# Patient Record
Sex: FEMALE | Race: WHITE | URBAN - METROPOLITAN AREA
[De-identification: names, ages, dates, MRNs, and addresses within clinical notes are randomized per-mention and may not be internally consistent; named-entity substitution may affect disease eponyms.]

---

## 2017-11-20 ENCOUNTER — DOCTOR'S OFFICE (OUTPATIENT)
Dept: URBAN - METROPOLITAN AREA CLINIC 125 | Facility: CLINIC | Age: 57
Setting detail: OPHTHALMOLOGY
End: 2017-11-20
Payer: COMMERCIAL

## 2017-11-20 DIAGNOSIS — H18.832: ICD-10-CM

## 2017-11-20 PROCEDURE — 92012 INTRM OPH EXAM EST PATIENT: CPT | Performed by: OPHTHALMOLOGY

## 2017-11-20 ASSESSMENT — REFRACTION_CURRENTRX
OS_OVR_VA: 20/
OD_OVR_VA: 20/
OS_OVR_VA: 20/
OS_OVR_VA: 20/

## 2017-11-20 ASSESSMENT — REFRACTION_AUTOREFRACTION
OD_SPHERE: +0.25
OS_SPHERE: +1.25
OS_AXIS: 084
OS_CYLINDER: -1.75
OD_AXIS: 010
OD_CYLINDER: -2.29

## 2017-11-20 ASSESSMENT — REFRACTION_MANIFEST
OD_VA1: 20/
OD_VA2: 20/
OD_VA3: 20/
OD_VA1: 20/
OS_VA3: 20/
OS_VA3: 20/
OD_VA2: 20/
OU_VA: 20/
OS_VA2: 20/
OD_VA2: 20/
OS_VA3: 20/
OS_VA1: 20/
OD_VA3: 20/
OD_VA3: 20/
OS_VA2: 20/
OD_VA1: 20/
OS_VA1: 20/
OS_VA1: 20/
OU_VA: 20/
OU_VA: 20/
OS_VA2: 20/

## 2017-11-20 ASSESSMENT — SPHEQUIV_DERIVED
OD_SPHEQUIV: -0.9
OS_SPHEQUIV: 0.375

## 2017-11-20 ASSESSMENT — VISUAL ACUITY
OD_BCVA: 20/25-1
OS_BCVA: 20/25-1

## 2021-01-09 ENCOUNTER — OFFICE VISIT (OUTPATIENT)
Dept: URGENT CARE | Facility: CLINIC | Age: 61
End: 2021-01-09
Payer: COMMERCIAL

## 2021-01-09 VITALS
RESPIRATION RATE: 20 BRPM | OXYGEN SATURATION: 95 % | HEIGHT: 66 IN | WEIGHT: 170 LBS | BODY MASS INDEX: 27.32 KG/M2 | HEART RATE: 85 BPM | TEMPERATURE: 98 F

## 2021-01-09 DIAGNOSIS — U07.1 COVID-19: Primary | ICD-10-CM

## 2021-01-09 DIAGNOSIS — R68.89 FLU-LIKE SYMPTOMS: ICD-10-CM

## 2021-01-09 PROCEDURE — U0005 INFEC AGEN DETEC AMPLI PROBE: HCPCS | Performed by: FAMILY MEDICINE

## 2021-01-09 PROCEDURE — U0003 INFECTIOUS AGENT DETECTION BY NUCLEIC ACID (DNA OR RNA); SEVERE ACUTE RESPIRATORY SYNDROME CORONAVIRUS 2 (SARS-COV-2) (CORONAVIRUS DISEASE [COVID-19]), AMPLIFIED PROBE TECHNIQUE, MAKING USE OF HIGH THROUGHPUT TECHNOLOGIES AS DESCRIBED BY CMS-2020-01-R: HCPCS | Performed by: FAMILY MEDICINE

## 2021-01-09 PROCEDURE — 99203 OFFICE O/P NEW LOW 30 MIN: CPT | Performed by: FAMILY MEDICINE

## 2021-01-09 RX ORDER — METOPROLOL SUCCINATE 25 MG/1
25 TABLET, EXTENDED RELEASE ORAL DAILY
COMMUNITY
Start: 2020-11-12

## 2021-01-09 RX ORDER — ATORVASTATIN CALCIUM 20 MG/1
20 TABLET, FILM COATED ORAL EVERY EVENING
COMMUNITY
Start: 2020-11-14

## 2021-01-09 RX ORDER — HYDROCHLOROTHIAZIDE 25 MG/1
25 TABLET ORAL DAILY
COMMUNITY
Start: 2020-10-19

## 2021-01-09 RX ORDER — LOSARTAN POTASSIUM 100 MG/1
TABLET ORAL
COMMUNITY
Start: 2020-12-16

## 2021-01-09 NOTE — PROGRESS NOTES
Assessment/Plan:  I recommend supportive care fluids rest and Tylenol as needed for pain or fever  If her symptoms become any worse she should present to the emergency department immediately  She should remain home in self quarantine for at least 10 days from now and at least 1 day without symptoms before leaving quarantine  Diagnoses and all orders for this visit:    COVID-19    Flu-like symptoms  -     Novel Coronavirus (COVID-19), PCR [facility] - Office Collection    Other orders  -     atorvastatin (LIPITOR) 20 mg tablet; Take 20 mg by mouth every evening  -     losartan (COZAAR) 100 MG tablet; Take 1 tablet by mouth once daily  -     hydrochlorothiazide (HYDRODIURIL) 25 mg tablet; Take 25 mg by mouth daily  -     metoprolol succinate (TOPROL-XL) 25 mg 24 hr tablet; Take 25 mg by mouth daily  -     ASPIRIN 81 PO; Take 81 mg by mouth            Subjective:        Patient ID: Maddie bartholomew a 64 y o  female  Patient presents with:  COVID-19: fever,cough,labored breathing, headache and body aches x1 day             The following portions of the patient's history were reviewed and updated as appropriate: allergies, current medications, past family history, past medical history, past social history, past surgical history and problem list       Review of Systems   Constitutional: Positive for activity change, appetite change, chills, fatigue and fever  HENT: Positive for congestion  Eyes: Negative  Respiratory: Positive for cough and shortness of breath  Cardiovascular: Negative  Gastrointestinal: Negative  Genitourinary: Negative  Objective:             Pulse 85   Temp 98 °F (36 7 °C) (Temporal)   Resp 20   Ht 5' 6" (1 676 m)   Wt 77 1 kg (170 lb)   SpO2 95%   BMI 27 44 kg/m²          Physical Exam  Vitals signs and nursing note reviewed  Constitutional:       Appearance: She is well-developed  HENT:      Head: Normocephalic and atraumatic        Right Ear: External ear normal       Left Ear: External ear normal       Nose: Nose normal    Eyes:      Conjunctiva/sclera: Conjunctivae normal       Pupils: Pupils are equal, round, and reactive to light  Neck:      Musculoskeletal: Normal range of motion and neck supple  Cardiovascular:      Rate and Rhythm: Normal rate and regular rhythm  Heart sounds: Normal heart sounds  Pulmonary:      Effort: Pulmonary effort is normal       Breath sounds: Rales present  No wheezing or rhonchi  Abdominal:      General: Bowel sounds are normal       Palpations: Abdomen is soft  Musculoskeletal: Normal range of motion  Skin:     General: Skin is warm and dry  Neurological:      Mental Status: She is alert and oriented to person, place, and time  Deep Tendon Reflexes: Reflexes are normal and symmetric     Psychiatric:         Behavior: Behavior normal

## 2021-01-09 NOTE — LETTER
January 9, 2021     Patient: Aaron Jacob   YOB: 1960   Date of Visit: 1/9/2021       To Whom it May Concern:    Aaron Jacob is under my professional care  She was seen in my office on 1/9/2021  She may return to work on Wednesday January 20, 2021 providing she is symptom free for 1 day before returning to work       If you have any questions or concerns, please don't hesitate to call           Sincerely,          Jm Xiao DO        CC: No Recipients

## 2021-01-11 LAB — SARS-COV-2 RNA SPEC QL NAA+PROBE: DETECTED

## 2021-01-11 NOTE — RESULT ENCOUNTER NOTE
I tried to get in touch with the patient regarding her test results  The number listed is a wrong number  If possible please try to get in touch with the patient to let her know her coronavirus test is positive and she should quarantine per CDC guidelines as we discussed during the visit  Thank you

## 2021-01-13 ENCOUNTER — TELEPHONE (OUTPATIENT)
Dept: URGENT CARE | Facility: CLINIC | Age: 61
End: 2021-01-13

## 2021-07-20 ENCOUNTER — OFFICE VISIT (OUTPATIENT)
Dept: URGENT CARE | Facility: CLINIC | Age: 61
End: 2021-07-20
Payer: COMMERCIAL

## 2021-07-20 VITALS
DIASTOLIC BLOOD PRESSURE: 56 MMHG | BODY MASS INDEX: 26.52 KG/M2 | OXYGEN SATURATION: 99 % | WEIGHT: 165 LBS | HEART RATE: 73 BPM | TEMPERATURE: 97.3 F | RESPIRATION RATE: 16 BRPM | HEIGHT: 66 IN | SYSTOLIC BLOOD PRESSURE: 124 MMHG

## 2021-07-20 DIAGNOSIS — S91.301A WOUND OF RIGHT FOOT: Primary | ICD-10-CM

## 2021-07-20 PROCEDURE — 90715 TDAP VACCINE 7 YRS/> IM: CPT | Performed by: PHYSICIAN ASSISTANT

## 2021-07-20 PROCEDURE — 90471 IMMUNIZATION ADMIN: CPT | Performed by: PHYSICIAN ASSISTANT

## 2021-07-20 PROCEDURE — 90715 TDAP VACCINE 7 YRS/> IM: CPT

## 2021-07-20 PROCEDURE — 99213 OFFICE O/P EST LOW 20 MIN: CPT | Performed by: PHYSICIAN ASSISTANT

## 2021-07-20 NOTE — PATIENT INSTRUCTIONS
Clean with soap and water  Topical antibiotic ointment  Keep area clean  Monitor for signs of infection including redness, swelling, drainage, streaking up the extremity and fever  Seek medical attention if you have these symptoms  Follow up with PCP in 3-5 days  Go to ER if symptoms become severe  Wound Healing and Your Diet   WHAT YOU NEED TO KNOW:   Your body uses nutrients from healthy foods to heal wounds caused by injury, surgery, or pressure injuries  There is no special diet that will heal your wound, but a healthy meal plan can help your wound heal faster  Nutrients that are important for healing are protein, zinc, and vitamin C  Liquids are also important for wound healing  DISCHARGE INSTRUCTIONS:   Follow a healthy meal plan:   · Eat a variety of foods from each food group every day  Eat regular meals and snacks to help you get enough calories and nutrients  If you have trouble eating 3 meals each day, eat 5 to 6 small meals throughout the day instead  Include good sources of protein, zinc, and vitamin C each day  · Drink liquids as directed  Drink plenty of liquids during and between meals, unless your healthcare provider has told you to limit liquids  Ask your healthcare provider or dietitian how much liquid to drink each day and which liquids are best for you  · Limit unhealthy foods,  such as those that are high in fat, sugar, and salt  Examples include doughnuts, cookies, fried foods, candy, and regular soda  These kinds of foods are low in nutrients that are important for healing  Good sources of protein:  Your dietitian will tell you how much protein and how many calories you need each day  The average amount of protein in foods is listed below in grams (g)  To find the exact amount of protein in a food, read the food labels on packaged items  · Dairy:      ? 1 cup of any type of milk (8 g)    ? ½ cup of evaporated canned milk (9 g)    ?  ¼ cup of nonfat dry milk (11 g)    ? 1 ounce of semi-hard or solid cheese (7 g)    ? ¼ cup of parmesan cheese (8 g)    ? ½ cup of cottage cheese (14 g)    ? ½ cup of pudding (4 g)    ? 1 cup of plain or fruit yogurt (8 g)    · Meats and meat substitutes:      ? 3 ounces of cooked freshwater fish (21 g)    ? 3 ounces of cooked shellfish (19 g)    ? ½ cup of canned tuna (14 g)    ? 3 ounces of cooked chicken, turkey, or other poultry (24 g)    ? 3 ounces of cooked beef, pork, lamb, or other red meat (21 g)    ? 1 large egg (6 g)    ? ¼ cup of fat-free egg substitute (5 g)    ? ½ cup of tofu or tempeh (10 g)    ? 1 cup of cooked dried beans, such as lance, kidney, or navy (15 g)    · Nuts and seeds:      ? 2 tablespoons of almonds, cashews, sunflower seeds, or walnuts (5 g)    ? 2 tablespoons of peanuts (7 g)    ? 2 tablespoons of peanut butter (8 g)     How to add extra protein:   · Add powdered milk to milk, cereals, scrambled eggs, soups, and casseroles  · Add cheese to sauces, soups, or vegetables  · Add eggs to tuna, salads, sauces, or casseroles  · Add nutrition supplements and breakfast drink mixes to milk or shakes  · Add nuts to foods or eat them as snacks  · Add meat (beef, chicken, or pork) to soups, casseroles, pasta dishes, or vegetables  · Add beans, peas, and other legumes to salads  · Eat cottage cheese or yogurt with fruit  Good sources of vitamin C:  Vitamin C is found in fruits and vegetables  Fruits such as oranges, strawberries, grapefruit, cantaloupe, and tangerines are good sources of vitamin C  Red and green bell peppers, broccoli, potatoes, tomatoes, and cabbage are also high in vitamin C      Good sources of zinc:  Good sources of zinc are beef, liver, and crab  Smaller amounts of zinc are found in sunflower seeds, almonds, peanut butter, eggs, and milk  Other foods that contain zinc include wheat germ, black-eyed peas, and whole-grain products  How to add extra calories to foods:   Your dietitian may recommend that you add extra calories to your meals if you are not eating enough  You can increase calories by adding butter, margarine, sugar, or jam to foods  Work with your dietitian if you have other medical conditions and need to follow a special diet  What else you should know about nutrients and wound healing: Your healthcare provider or dietitian may recommend vitamin and nutrition supplements if your body is low in certain nutrients  If your dietitian recommends a liquid nutrition supplement, take it between meals  Ask your healthcare provider which supplements are right for you  © Copyright [address] 2021 Information is for End User's use only and may not be sold, redistributed or otherwise used for commercial purposes  All illustrations and images included in CareNotes® are the copyrighted property of A D A M [employer]  or Mattie Carrillo  The above information is an  only  It is not intended as medical advice for individual conditions or treatments  Talk to your doctor, nurse or pharmacist before following any medical regimen to see if it is safe and effective for you

## 2021-07-20 NOTE — PROGRESS NOTES
[facility]        NAME: Adam Escobar is a 64 y o  female  : 1960    MRN: [id number]  DATE: 2021  TIME: 9:01 AM    Assessment and Plan   Wound of right foot [S91 301A]  1  Wound of right foot  Tdap Vaccine greater than or equal to 8yo      discussed with patient possibility of splinter however, no visible, palpable   foreign body on exam   Discussed with patient at x-ray would not show would foreign body  She is to monitor follow-up with family doctor if symptoms continue  Patient Instructions   Clean with soap and water  Topical antibiotic ointment  Keep area clean  Monitor for signs of infection including redness, swelling, drainage, streaking up the extremity and fever    Follow up with PCP in 3-5 days  Proceed to  ER if symptoms worsen  Chief Complaint     Chief Complaint   Patient presents with    Foot Injury     ran into a tree root yesterday and punctured right foot, started swelling last night  last tdap          History of Present Illness       Patient is a 82-year-old female with significant past medical history of hypertension and hyperlipidemia presents the office after sustaining injury to her right foot yesterday  Patient reports she was walking outside with full flops on his bumped her foot into a root growing out the ground  She puncture herself with the would  Denies known foreign body  States the area has become more swollen and tender since the injury  Denies fevers, chills, purulent drainage, erythema, or red streaking  Last tetanus   Review of Systems   Review of Systems   Constitutional: Negative for chills and fever  Skin: Positive for wound           Current Medications       Current Outpatient Medications:     ASPIRIN 81 PO, Take 81 mg by mouth, Disp: , Rfl:     atorvastatin (LIPITOR) 20 mg tablet, Take 20 mg by mouth every evening, Disp: , Rfl:     hydrochlorothiazide (HYDRODIURIL) 25 mg tablet, Take 25 mg by mouth daily, Disp: , Rfl:     losartan (COZAAR) 100 MG tablet, Take 1 tablet by mouth once daily, Disp: , Rfl:     metoprolol succinate (TOPROL-XL) 25 mg 24 hr tablet, Take 25 mg by mouth daily, Disp: , Rfl:     Current Allergies     Allergies as of 07/20/2021    (No Known Allergies)            The following portions of the patient's history were reviewed and updated as appropriate: allergies, current medications, past family history, past medical history, past social history, past surgical history and problem list      Past Medical History:   Diagnosis Date    High cholesterol     Hypertension        Past Surgical History:   Procedure Laterality Date    CYST REMOVAL         No family history on file  Medications have been verified  Objective   /56   Pulse 73   Temp (!) 97 3 °F (36 3 °C)   Resp 16   Ht 5' 6" (1 676 m)   Wt 74 8 kg (165 lb)   SpO2 99%   BMI 26 63 kg/m²   No LMP recorded  Patient is postmenopausal        Physical Exam     Physical Exam  Vitals and nursing note reviewed  Constitutional:       Appearance: She is well-developed  HENT:      Head: Normocephalic and atraumatic  Right Ear: External ear normal       Left Ear: External ear normal       Nose: Nose normal    Eyes:      General: Lids are normal       Conjunctiva/sclera: Conjunctivae normal    Skin:     General: Skin is warm and dry  Capillary Refill: Capillary refill takes less than 2 seconds  Findings: Wound (Abrasion/puncture wound to ventral aspect of right foot  No visual/palpable foreign body ) present  Neurological:      Mental Status: She is alert                   Right foot

## 2024-05-09 ENCOUNTER — OFFICE VISIT (OUTPATIENT)
Dept: URGENT CARE | Facility: CLINIC | Age: 64
End: 2024-05-09
Payer: COMMERCIAL

## 2024-05-09 VITALS
HEART RATE: 83 BPM | OXYGEN SATURATION: 96 % | SYSTOLIC BLOOD PRESSURE: 152 MMHG | RESPIRATION RATE: 18 BRPM | HEIGHT: 66 IN | DIASTOLIC BLOOD PRESSURE: 78 MMHG | TEMPERATURE: 97.9 F | BODY MASS INDEX: 27.64 KG/M2 | WEIGHT: 172 LBS

## 2024-05-09 DIAGNOSIS — B96.89 ACUTE BACTERIAL BRONCHITIS: Primary | ICD-10-CM

## 2024-05-09 DIAGNOSIS — J22 LOWER RESP. TRACT INFECTION: ICD-10-CM

## 2024-05-09 DIAGNOSIS — J20.8 ACUTE BACTERIAL BRONCHITIS: Primary | ICD-10-CM

## 2024-05-09 PROCEDURE — 99213 OFFICE O/P EST LOW 20 MIN: CPT

## 2024-05-09 RX ORDER — ALBUTEROL SULFATE 90 UG/1
2 AEROSOL, METERED RESPIRATORY (INHALATION) EVERY 6 HOURS PRN
Qty: 8.5 G | Refills: 0 | Status: SHIPPED | OUTPATIENT
Start: 2024-05-09

## 2024-05-09 RX ORDER — BENZONATATE 200 MG/1
200 CAPSULE ORAL 3 TIMES DAILY PRN
Qty: 20 CAPSULE | Refills: 0 | Status: SHIPPED | OUTPATIENT
Start: 2024-05-09

## 2024-05-09 RX ORDER — METHYLPREDNISOLONE 4 MG/1
TABLET ORAL
Qty: 21 TABLET | Refills: 0 | Status: SHIPPED | OUTPATIENT
Start: 2024-05-09

## 2024-05-09 RX ORDER — CARVEDILOL 12.5 MG/1
TABLET ORAL
COMMUNITY
Start: 2024-05-07

## 2024-05-09 RX ORDER — AZITHROMYCIN 250 MG/1
TABLET, FILM COATED ORAL
Qty: 6 TABLET | Refills: 0 | Status: SHIPPED | OUTPATIENT
Start: 2024-05-09 | End: 2024-05-13

## 2024-05-09 RX ORDER — CHLORTHALIDONE 25 MG/1
1 TABLET ORAL EVERY MORNING
COMMUNITY
Start: 2024-03-26

## 2024-05-09 NOTE — PROGRESS NOTES
"[facility]        NAME: Yamini Little is a 63 y.o. female  : 1960    MRN: [id number]  DATE: May 9, 2024  TIME: 5:06 PM    Assessment and Plan   Acute bacterial bronchitis [J20.8, B96.89]  1. Acute bacterial bronchitis  benzonatate (TESSALON) 200 MG capsule    albuterol (ProAir HFA) 90 mcg/act inhaler    methylPREDNISolone 4 MG tablet therapy pack    azithromycin (ZITHROMAX) 250 mg tablet      2. Lower resp. tract infection        Supportive care discussed as well with patient.   Patient Instructions     Follow up with PCP in 3-5 days if no improvement. Proceed to ER if symptoms worsen.    Chief Complaint     Chief Complaint   Patient presents with    Cold Like Symptoms     Cough and chest/sinus congestion started       History of Present Illness     Yamini Little is a 63 y.o. female presenting to the office today for upper respiratory complaints.   Symptoms have been present for 6 days, and include extremely deep cough, sinus congestion, chest congestion. She states she feels like there is stuff \"rolling around\" in her chest.  She has tried cough drops, delsym, albuterol for her symptoms, minimal relief.  Sick contacts include: none    Review of Systems     Review of Systems   Constitutional:  Negative for chills and fever.   HENT:  Positive for congestion. Negative for ear pain, sore throat and trouble swallowing.    Respiratory:  Positive for cough. Negative for shortness of breath, wheezing and stridor.    Gastrointestinal: Negative.  Negative for abdominal pain, nausea and vomiting.   Genitourinary: Negative.    Musculoskeletal: Negative.    Skin: Negative.    Neurological: Negative.      Current Medications       Current Outpatient Medications:     albuterol (ProAir HFA) 90 mcg/act inhaler, Inhale 2 puffs every 6 (six) hours as needed for wheezing, Disp: 8.5 g, Rfl: 0    atorvastatin (LIPITOR) 20 mg tablet, Take 20 mg by mouth every evening, Disp: , Rfl:     azithromycin (ZITHROMAX) 250 " "mg tablet, Take 2 tablets today then 1 tablet daily x 4 days, Disp: 6 tablet, Rfl: 0    benzonatate (TESSALON) 200 MG capsule, Take 1 capsule (200 mg total) by mouth 3 (three) times a day as needed for cough, Disp: 20 capsule, Rfl: 0    carvedilol (COREG) 12.5 mg tablet, TAKE 1 TABLET BY MOUTH IN THE MORNING AND 1 TABLET IN THE EVENING WITH MEALS, Disp: , Rfl:     chlorthalidone 25 mg tablet, Take 1 tablet by mouth every morning, Disp: , Rfl:     losartan (COZAAR) 100 MG tablet, Take 1 tablet by mouth once daily, Disp: , Rfl:     methylPREDNISolone 4 MG tablet therapy pack, Use as directed on package, Disp: 21 tablet, Rfl: 0    ASPIRIN 81 PO, Take 81 mg by mouth (Patient not taking: Reported on 5/9/2024), Disp: , Rfl:     hydrochlorothiazide (HYDRODIURIL) 25 mg tablet, Take 25 mg by mouth daily (Patient not taking: Reported on 5/9/2024), Disp: , Rfl:     metoprolol succinate (TOPROL-XL) 25 mg 24 hr tablet, Take 25 mg by mouth daily (Patient not taking: Reported on 5/9/2024), Disp: , Rfl:     Current Allergies     Allergies as of 05/09/2024    (No Known Allergies)            The following portions of the patient's history were reviewed and updated as appropriate: allergies, current medications, past family history, past medical history, past social history, past surgical history and problem list.     Past Medical History:   Diagnosis Date    High cholesterol     Hypertension        Past Surgical History:   Procedure Laterality Date    CYST REMOVAL         No family history on file.    Medications have been verified.    Objective     /78   Pulse 83   Temp 97.9 °F (36.6 °C)   Resp 18   Ht 5' 6\" (1.676 m)   Wt 78 kg (172 lb)   SpO2 96%   BMI 27.76 kg/m²   No LMP recorded. Patient is postmenopausal.     Physical Exam     Physical Exam  Vitals and nursing note reviewed.   Constitutional:       General: She is not in acute distress.     Appearance: Normal appearance. She is normal weight. She is not " ill-appearing, toxic-appearing or diaphoretic.   HENT:      Head: Normocephalic and atraumatic.      Right Ear: Tympanic membrane, ear canal and external ear normal. There is no impacted cerumen.      Left Ear: Tympanic membrane, ear canal and external ear normal. There is no impacted cerumen.      Nose: Congestion present. No rhinorrhea.      Mouth/Throat:      Mouth: Mucous membranes are moist.      Pharynx: No oropharyngeal exudate or posterior oropharyngeal erythema.   Eyes:      General: No scleral icterus.        Right eye: No discharge.         Left eye: No discharge.      Conjunctiva/sclera: Conjunctivae normal.   Cardiovascular:      Rate and Rhythm: Normal rate and regular rhythm.      Pulses: Normal pulses.      Heart sounds: Normal heart sounds. No murmur heard.     No friction rub. No gallop.   Pulmonary:      Effort: Pulmonary effort is normal. No respiratory distress.      Breath sounds: No stridor. Wheezing (diffuse expiratory wheezing) present. No rhonchi or rales.   Chest:      Chest wall: No tenderness.   Musculoskeletal:         General: Normal range of motion.      Cervical back: Normal range of motion and neck supple. No rigidity or tenderness.   Lymphadenopathy:      Cervical: No cervical adenopathy.   Skin:     General: Skin is warm and dry.      Capillary Refill: Capillary refill takes less than 2 seconds.      Coloration: Skin is not jaundiced.      Findings: No bruising or rash.   Neurological:      General: No focal deficit present.      Mental Status: She is alert. Mental status is at baseline.   Psychiatric:         Mood and Affect: Mood normal.         Behavior: Behavior normal.

## 2024-06-28 DIAGNOSIS — Z00.6 ENCOUNTER FOR EXAMINATION FOR NORMAL COMPARISON OR CONTROL IN CLINICAL RESEARCH PROGRAM: ICD-10-CM

## 2024-07-06 ENCOUNTER — APPOINTMENT (OUTPATIENT)
Dept: LAB | Facility: HOSPITAL | Age: 64
End: 2024-07-06

## 2024-07-06 DIAGNOSIS — Z00.6 ENCOUNTER FOR EXAMINATION FOR NORMAL COMPARISON OR CONTROL IN CLINICAL RESEARCH PROGRAM: ICD-10-CM

## 2024-07-06 PROCEDURE — 36415 COLL VENOUS BLD VENIPUNCTURE: CPT

## 2024-07-19 LAB
APOB+LDLR+PCSK9 GENE MUT ANL BLD/T: NOT DETECTED
BRCA1+BRCA2 DEL+DUP + FULL MUT ANL BLD/T: NOT DETECTED
MLH1+MSH2+MSH6+PMS2 GN DEL+DUP+FUL M: NOT DETECTED